# Patient Record
Sex: FEMALE | Race: WHITE | NOT HISPANIC OR LATINO | Employment: OTHER | URBAN - METROPOLITAN AREA
[De-identification: names, ages, dates, MRNs, and addresses within clinical notes are randomized per-mention and may not be internally consistent; named-entity substitution may affect disease eponyms.]

---

## 2024-09-02 ENCOUNTER — OFFICE VISIT (OUTPATIENT)
Dept: URGENT CARE | Facility: PHYSICIAN GROUP | Age: 79
End: 2024-09-02
Payer: MEDICARE

## 2024-09-02 VITALS
SYSTOLIC BLOOD PRESSURE: 90 MMHG | HEART RATE: 102 BPM | OXYGEN SATURATION: 93 % | WEIGHT: 109 LBS | BODY MASS INDEX: 18.61 KG/M2 | RESPIRATION RATE: 14 BRPM | HEIGHT: 64 IN | DIASTOLIC BLOOD PRESSURE: 48 MMHG | TEMPERATURE: 97 F

## 2024-09-02 DIAGNOSIS — S90.30XA CONTUSION OF DORSUM OF FOOT: ICD-10-CM

## 2024-09-02 PROCEDURE — 99203 OFFICE O/P NEW LOW 30 MIN: CPT | Performed by: NURSE PRACTITIONER

## 2024-09-02 PROCEDURE — 3078F DIAST BP <80 MM HG: CPT | Performed by: NURSE PRACTITIONER

## 2024-09-02 PROCEDURE — 3074F SYST BP LT 130 MM HG: CPT | Performed by: NURSE PRACTITIONER

## 2024-09-02 RX ORDER — TEMAZEPAM 15 MG/1
15 CAPSULE ORAL
COMMUNITY
Start: 2024-08-21

## 2024-09-02 RX ORDER — LEVOTHYROXINE SODIUM 88 UG/1
88 TABLET ORAL DAILY
COMMUNITY
Start: 2024-08-19

## 2024-09-02 RX ORDER — PREDNISONE 10 MG/1
10 TABLET ORAL DAILY
COMMUNITY
Start: 2024-08-19

## 2024-09-02 RX ORDER — APIXABAN 5 MG/1
5 TABLET, FILM COATED ORAL 2 TIMES DAILY
COMMUNITY
Start: 2024-08-19

## 2024-09-02 RX ORDER — FLUTICASONE PROPIONATE AND SALMETEROL 250; 50 UG/1; UG/1
POWDER RESPIRATORY (INHALATION)
COMMUNITY
Start: 2024-08-19

## 2024-09-02 RX ORDER — SPIRONOLACTONE 25 MG/1
25 TABLET ORAL DAILY
COMMUNITY
Start: 2024-08-19

## 2024-09-02 RX ORDER — LACTULOSE 10 G/15ML
SOLUTION ORAL; RECTAL
COMMUNITY
Start: 2024-08-19

## 2024-09-02 ASSESSMENT — ENCOUNTER SYMPTOMS
FALLS: 0
SENSORY CHANGE: 0
WEAKNESS: 0
CHILLS: 0
FEVER: 0
TINGLING: 0
BRUISES/BLEEDS EASILY: 0
MYALGIAS: 0

## 2024-09-03 NOTE — PROGRESS NOTES
Subjective     Kerrie Burkett is a 79 y.o. female who presents with Other (L foot infection from laceration, x1 week )            Other  Pertinent negatives include no chills, fever, myalgias, rash or weakness.   Kerrie has come into urgent care with her daughter today as she has been having redness on the top of her left foot x 1 week.  States may possibly have hit her left foot on her wheelchair.  Daughter  does have redness surrounding scabbing at the top of her foot.   foot will hurt at night when she rubs against the sheets.  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   pain will radiate from her toes to her the back of her foot. Daughter wanted to make sure there was no infection.    PMH:  has no past medical history on file.  MEDS:   Current Outpatient Medications:     ELIQUIS 5 MG Tab, Take 5 mg by mouth 2 times a day., Disp: , Rfl:     spironolactone (ALDACTONE) 25 MG Tab, Take 25 mg by mouth every day., Disp: , Rfl:     predniSONE (DELTASONE) 10 MG Tab, Take 10 mg by mouth every day., Disp: , Rfl:     levothyroxine (SYNTHROID) 88 MCG Tab, Take 88 mcg by mouth every day., Disp: , Rfl:     temazepam (RESTORIL) 15 MG Cap, Take 15 mg by mouth at bedtime as needed for Sleep., Disp: , Rfl:     fluticasone-salmeterol (ADVAIR) 250-50 MCG/ACT AEROSOL POWDER, BREATH ACTIVATED, USE 1 INHALATION BY MOUTH TWICE DAILY. RINSE MOUTH AFTER EACH USE., Disp: , Rfl:     ENULOSE 10 GM/15ML Solution, use 15 mL's by mouth twice daily as needed for constipation, Disp: , Rfl:   ALLERGIES: No Known Allergies  SURGHX: History reviewed. No pertinent surgical history.  SOCHX:    FH: Family history was reviewed, no pertinent findings to report      Review of Systems   Constitutional:  Negative for chills, fever and malaise/fatigue.   Musculoskeletal:  Negative for falls, joint pain and myalgias.   Skin:  Negative for itching and rash.        Redness with central scab at left dorsum of mid metatarsals from  "possible injury.     Neurological:  Negative for tingling, sensory change and weakness.   Endo/Heme/Allergies:  Does not bruise/bleed easily.   All other systems reviewed and are negative.             Objective     BP 90/48 (BP Location: Left arm, Patient Position: Sitting, BP Cuff Size: Adult)   Pulse (!) 102   Temp 36.1 °C (97 °F) (Temporal)   Resp 14   Ht 1.626 m (5' 4\")   Wt 49.4 kg (109 lb)   SpO2 93%   BMI 18.71 kg/m²      Physical Exam  Vitals reviewed.   Constitutional:       General: She is not in acute distress.     Appearance: Normal appearance. She is not ill-appearing, toxic-appearing or diaphoretic.   Musculoskeletal:      Left foot: Normal range of motion and normal capillary refill. No swelling, deformity, bunion, Charcot foot, foot drop, prominent metatarsal heads, laceration, tenderness, bony tenderness or crepitus. Normal pulse.   Feet:      Left foot:      Skin integrity: Erythema present. No ulcer, blister, skin breakdown, warmth, callus, dry skin or fissure.      Comments: Localized erythema on dorsum of left mid foot.  No tenderness on palpation of metatarsals.  Localized erythema surrounding single central scab.  No bruising or deformity of the dorsum of foot.    Skin:     General: Skin is warm and dry.      Findings: Abrasion and erythema present. No laceration, lesion or rash.   Neurological:      Mental Status: She is alert and oriented to person, place, and time.   Psychiatric:         Attention and Perception: Attention normal.         Mood and Affect: Mood normal.         Speech: Speech normal.         Behavior: Behavior normal. Behavior is cooperative.                             Assessment & Plan        Assessment & Plan  Contusion of dorsum of foot       -No indication of skin infection of left foot, no need for antibiotics at this time  -Recommend to wear socks at night due to discomfort of foot against sheets possible from arthritis pain vs an infection from injury, " arthritic pain may be benefited from warmth   -May use over the counter Ibuprofen or Tylenol as needed for any pain/swelling  -May use cool compresses for any swelling as needed  -Clean skin with mild soap and water, avoid antibacterial soap as this may be drying to skin  -May apply triple antibiotic ointment to scab area as needed and cover with bandage to protect wound site  -Monitor for increased swelling/redness, pain, drainage, red streaking of foot, deformity, numbness/tingling in toes, decreased range of motion with weight bearing- need re-evaluation

## 2024-11-24 ENCOUNTER — HOSPITAL ENCOUNTER (OUTPATIENT)
Dept: RADIOLOGY | Facility: MEDICAL CENTER | Age: 79
End: 2024-11-24

## 2024-11-24 ENCOUNTER — HOSPITAL ENCOUNTER (INPATIENT)
Facility: MEDICAL CENTER | Age: 79
LOS: 2 days | End: 2024-11-26
Attending: STUDENT IN AN ORGANIZED HEALTH CARE EDUCATION/TRAINING PROGRAM | Admitting: STUDENT IN AN ORGANIZED HEALTH CARE EDUCATION/TRAINING PROGRAM
Payer: MEDICARE

## 2024-11-24 DIAGNOSIS — J18.9 PNEUMONIA OF BOTH LUNGS DUE TO INFECTIOUS ORGANISM, UNSPECIFIED PART OF LUNG: ICD-10-CM

## 2024-11-24 PROCEDURE — 99418 PROLNG IP/OBS E/M EA 15 MIN: CPT | Mod: MISDOCU | Performed by: STUDENT IN AN ORGANIZED HEALTH CARE EDUCATION/TRAINING PROGRAM

## 2024-11-24 PROCEDURE — 770020 HCHG ROOM/CARE - TELE (206)

## 2024-11-25 ENCOUNTER — APPOINTMENT (OUTPATIENT)
Dept: RADIOLOGY | Facility: MEDICAL CENTER | Age: 79
End: 2024-11-25
Attending: STUDENT IN AN ORGANIZED HEALTH CARE EDUCATION/TRAINING PROGRAM
Payer: MEDICARE

## 2024-11-25 PROBLEM — E87.20 LACTIC ACIDOSIS: Status: ACTIVE | Noted: 2024-11-25

## 2024-11-25 PROBLEM — Z71.89 ACP (ADVANCE CARE PLANNING): Status: ACTIVE | Noted: 2024-11-25

## 2024-11-25 PROBLEM — E83.42 HYPOMAGNESEMIA: Status: RESOLVED | Noted: 2024-11-25 | Resolved: 2024-11-25

## 2024-11-25 PROBLEM — E83.42 HYPOMAGNESEMIA: Status: ACTIVE | Noted: 2024-11-25

## 2024-11-25 PROBLEM — Z86.718 HISTORY OF DVT (DEEP VEIN THROMBOSIS): Status: ACTIVE | Noted: 2024-11-25

## 2024-11-25 PROBLEM — E27.40 ADRENAL INSUFFICIENCY (HCC): Status: ACTIVE | Noted: 2024-11-25

## 2024-11-25 PROBLEM — E03.9 HYPOTHYROIDISM: Status: ACTIVE | Noted: 2024-11-25

## 2024-11-25 PROBLEM — A41.9 SEPSIS (HCC): Status: ACTIVE | Noted: 2024-11-25

## 2024-11-25 PROBLEM — K74.60 CIRRHOSIS (HCC): Status: ACTIVE | Noted: 2024-11-25

## 2024-11-25 PROBLEM — I21.4 NSTEMI (NON-ST ELEVATED MYOCARDIAL INFARCTION) (HCC): Status: ACTIVE | Noted: 2024-11-25

## 2024-11-25 LAB
ALBUMIN SERPL BCP-MCNC: 2.8 G/DL (ref 3.2–4.9)
ALBUMIN/GLOB SERPL: 1.1 G/DL
ALP SERPL-CCNC: 204 U/L (ref 30–99)
ALT SERPL-CCNC: 44 U/L (ref 2–50)
ANION GAP SERPL CALC-SCNC: 10 MMOL/L (ref 7–16)
APTT PPP: 38.4 SEC (ref 24.7–36)
AST SERPL-CCNC: 88 U/L (ref 12–45)
BASOPHILS # BLD AUTO: 0.1 % (ref 0–1.8)
BASOPHILS # BLD: 0.02 K/UL (ref 0–0.12)
BILIRUB SERPL-MCNC: 1.5 MG/DL (ref 0.1–1.5)
BUN SERPL-MCNC: 13 MG/DL (ref 8–22)
CALCIUM ALBUM COR SERPL-MCNC: 9.1 MG/DL (ref 8.5–10.5)
CALCIUM SERPL-MCNC: 8.1 MG/DL (ref 8.5–10.5)
CHLORIDE SERPL-SCNC: 112 MMOL/L (ref 96–112)
CO2 SERPL-SCNC: 20 MMOL/L (ref 20–33)
CORTIS SERPL-MCNC: 1.7 UG/DL (ref 0–23)
CREAT SERPL-MCNC: 0.66 MG/DL (ref 0.5–1.4)
EKG IMPRESSION: NORMAL
EOSINOPHIL # BLD AUTO: 0.01 K/UL (ref 0–0.51)
EOSINOPHIL NFR BLD: 0.1 % (ref 0–6.9)
ERYTHROCYTE [DISTWIDTH] IN BLOOD BY AUTOMATED COUNT: 52.8 FL (ref 35.9–50)
GFR SERPLBLD CREATININE-BSD FMLA CKD-EPI: 88 ML/MIN/1.73 M 2
GLOBULIN SER CALC-MCNC: 2.6 G/DL (ref 1.9–3.5)
GLUCOSE SERPL-MCNC: 122 MG/DL (ref 65–99)
HCT VFR BLD AUTO: 40.1 % (ref 37–47)
HGB BLD-MCNC: 12.8 G/DL (ref 12–16)
IMM GRANULOCYTES # BLD AUTO: 0.08 K/UL (ref 0–0.11)
IMM GRANULOCYTES NFR BLD AUTO: 0.6 % (ref 0–0.9)
INR PPP: 1.2 (ref 0.87–1.13)
LACTATE SERPL-SCNC: 1.4 MMOL/L (ref 0.5–2)
LACTATE SERPL-SCNC: 2.2 MMOL/L (ref 0.5–2)
LYMPHOCYTES # BLD AUTO: 1.15 K/UL (ref 1–4.8)
LYMPHOCYTES NFR BLD: 8.6 % (ref 22–41)
MAGNESIUM SERPL-MCNC: 2 MG/DL (ref 1.5–2.5)
MCH RBC QN AUTO: 32.4 PG (ref 27–33)
MCHC RBC AUTO-ENTMCNC: 31.9 G/DL (ref 32.2–35.5)
MCV RBC AUTO: 101.5 FL (ref 81.4–97.8)
MONOCYTES # BLD AUTO: 0.85 K/UL (ref 0–0.85)
MONOCYTES NFR BLD AUTO: 6.4 % (ref 0–13.4)
NEUTROPHILS # BLD AUTO: 11.24 K/UL (ref 1.82–7.42)
NEUTROPHILS NFR BLD: 84.2 % (ref 44–72)
NRBC # BLD AUTO: 0 K/UL
NRBC BLD-RTO: 0 /100 WBC (ref 0–0.2)
PLATELET # BLD AUTO: 99 K/UL (ref 164–446)
PLATELETS.RETICULATED NFR BLD AUTO: 2.2 % (ref 0.6–13.1)
PMV BLD AUTO: 10 FL (ref 9–12.9)
POTASSIUM SERPL-SCNC: 4.2 MMOL/L (ref 3.6–5.5)
PROCALCITONIN SERPL-MCNC: 0.83 NG/ML
PROT SERPL-MCNC: 5.4 G/DL (ref 6–8.2)
PROTHROMBIN TIME: 15.3 SEC (ref 12–14.6)
RBC # BLD AUTO: 3.95 M/UL (ref 4.2–5.4)
SODIUM SERPL-SCNC: 142 MMOL/L (ref 135–145)
TROPONIN T SERPL-MCNC: 222 NG/L (ref 6–19)
TROPONIN T SERPL-MCNC: 380 NG/L (ref 6–19)
TROPONIN T SERPL-MCNC: 419 NG/L (ref 6–19)
UFH PPP CHRO-ACNC: 0.5 IU/ML
UFH PPP CHRO-ACNC: 0.63 IU/ML
UFH PPP CHRO-ACNC: 0.65 IU/ML
WBC # BLD AUTO: 13.4 K/UL (ref 4.8–10.8)

## 2024-11-25 PROCEDURE — 84484 ASSAY OF TROPONIN QUANT: CPT

## 2024-11-25 PROCEDURE — 87040 BLOOD CULTURE FOR BACTERIA: CPT

## 2024-11-25 PROCEDURE — 85730 THROMBOPLASTIN TIME PARTIAL: CPT

## 2024-11-25 PROCEDURE — 700102 HCHG RX REV CODE 250 W/ 637 OVERRIDE(OP): Performed by: STUDENT IN AN ORGANIZED HEALTH CARE EDUCATION/TRAINING PROGRAM

## 2024-11-25 PROCEDURE — 83605 ASSAY OF LACTIC ACID: CPT | Mod: 91

## 2024-11-25 PROCEDURE — 770020 HCHG ROOM/CARE - TELE (206)

## 2024-11-25 PROCEDURE — 700111 HCHG RX REV CODE 636 W/ 250 OVERRIDE (IP)

## 2024-11-25 PROCEDURE — 99291 CRITICAL CARE FIRST HOUR: CPT | Performed by: STUDENT IN AN ORGANIZED HEALTH CARE EDUCATION/TRAINING PROGRAM

## 2024-11-25 PROCEDURE — A9270 NON-COVERED ITEM OR SERVICE: HCPCS | Performed by: NURSE PRACTITIONER

## 2024-11-25 PROCEDURE — 85055 RETICULATED PLATELET ASSAY: CPT

## 2024-11-25 PROCEDURE — 93010 ELECTROCARDIOGRAM REPORT: CPT | Performed by: INTERNAL MEDICINE

## 2024-11-25 PROCEDURE — 700117 HCHG RX CONTRAST REV CODE 255: Performed by: STUDENT IN AN ORGANIZED HEALTH CARE EDUCATION/TRAINING PROGRAM

## 2024-11-25 PROCEDURE — 84145 PROCALCITONIN (PCT): CPT

## 2024-11-25 PROCEDURE — 99497 ADVNCD CARE PLAN 30 MIN: CPT | Performed by: STUDENT IN AN ORGANIZED HEALTH CARE EDUCATION/TRAINING PROGRAM

## 2024-11-25 PROCEDURE — 83880 ASSAY OF NATRIURETIC PEPTIDE: CPT

## 2024-11-25 PROCEDURE — 700105 HCHG RX REV CODE 258

## 2024-11-25 PROCEDURE — 700102 HCHG RX REV CODE 250 W/ 637 OVERRIDE(OP): Performed by: NURSE PRACTITIONER

## 2024-11-25 PROCEDURE — 99222 1ST HOSP IP/OBS MODERATE 55: CPT | Performed by: INTERNAL MEDICINE

## 2024-11-25 PROCEDURE — 74177 CT ABD & PELVIS W/CONTRAST: CPT

## 2024-11-25 PROCEDURE — 85520 HEPARIN ASSAY: CPT

## 2024-11-25 PROCEDURE — A9270 NON-COVERED ITEM OR SERVICE: HCPCS | Performed by: STUDENT IN AN ORGANIZED HEALTH CARE EDUCATION/TRAINING PROGRAM

## 2024-11-25 PROCEDURE — 85025 COMPLETE CBC W/AUTO DIFF WBC: CPT

## 2024-11-25 PROCEDURE — 93005 ELECTROCARDIOGRAM TRACING: CPT | Performed by: STUDENT IN AN ORGANIZED HEALTH CARE EDUCATION/TRAINING PROGRAM

## 2024-11-25 PROCEDURE — 83735 ASSAY OF MAGNESIUM: CPT

## 2024-11-25 PROCEDURE — 700111 HCHG RX REV CODE 636 W/ 250 OVERRIDE (IP): Performed by: STUDENT IN AN ORGANIZED HEALTH CARE EDUCATION/TRAINING PROGRAM

## 2024-11-25 PROCEDURE — 700105 HCHG RX REV CODE 258: Performed by: STUDENT IN AN ORGANIZED HEALTH CARE EDUCATION/TRAINING PROGRAM

## 2024-11-25 PROCEDURE — 85610 PROTHROMBIN TIME: CPT

## 2024-11-25 PROCEDURE — 71275 CT ANGIOGRAPHY CHEST: CPT

## 2024-11-25 PROCEDURE — 80053 COMPREHEN METABOLIC PANEL: CPT

## 2024-11-25 PROCEDURE — 82533 TOTAL CORTISOL: CPT

## 2024-11-25 RX ORDER — ONDANSETRON 2 MG/ML
4 INJECTION INTRAMUSCULAR; INTRAVENOUS EVERY 4 HOURS PRN
Status: DISCONTINUED | OUTPATIENT
Start: 2024-11-25 | End: 2024-11-26 | Stop reason: HOSPADM

## 2024-11-25 RX ORDER — LACTULOSE 10 G/15ML
15 SOLUTION ORAL 2 TIMES DAILY
Status: DISCONTINUED | OUTPATIENT
Start: 2024-11-25 | End: 2024-11-26 | Stop reason: HOSPADM

## 2024-11-25 RX ORDER — PREDNISONE 10 MG/1
10 TABLET ORAL DAILY
Status: DISCONTINUED | OUTPATIENT
Start: 2024-11-25 | End: 2024-11-26 | Stop reason: HOSPADM

## 2024-11-25 RX ORDER — ACETAMINOPHEN 500 MG
500-1000 TABLET ORAL EVERY 6 HOURS PRN
COMMUNITY

## 2024-11-25 RX ORDER — AZITHROMYCIN 250 MG/1
500 TABLET, FILM COATED ORAL DAILY
Status: DISCONTINUED | OUTPATIENT
Start: 2024-11-26 | End: 2024-11-26 | Stop reason: HOSPADM

## 2024-11-25 RX ORDER — LEVOTHYROXINE SODIUM 88 UG/1
88 TABLET ORAL
Status: DISCONTINUED | OUTPATIENT
Start: 2024-11-25 | End: 2024-11-26 | Stop reason: HOSPADM

## 2024-11-25 RX ORDER — SODIUM CHLORIDE 9 MG/ML
INJECTION, SOLUTION INTRAVENOUS CONTINUOUS
Status: DISCONTINUED | OUTPATIENT
Start: 2024-11-25 | End: 2024-11-25

## 2024-11-25 RX ORDER — AZITHROMYCIN 500 MG/5ML
500 INJECTION, POWDER, LYOPHILIZED, FOR SOLUTION INTRAVENOUS EVERY 24 HOURS
Status: DISCONTINUED | OUTPATIENT
Start: 2024-11-25 | End: 2024-11-25

## 2024-11-25 RX ORDER — TEMAZEPAM 7.5 MG/1
15 CAPSULE ORAL
Status: DISCONTINUED | OUTPATIENT
Start: 2024-11-25 | End: 2024-11-26 | Stop reason: HOSPADM

## 2024-11-25 RX ORDER — HEPARIN SODIUM 1000 [USP'U]/ML
30 INJECTION, SOLUTION INTRAVENOUS; SUBCUTANEOUS PRN
Status: DISCONTINUED | OUTPATIENT
Start: 2024-11-25 | End: 2024-11-26

## 2024-11-25 RX ORDER — SPIRONOLACTONE 25 MG/1
25 TABLET ORAL
Status: DISCONTINUED | OUTPATIENT
Start: 2024-11-25 | End: 2024-11-26 | Stop reason: HOSPADM

## 2024-11-25 RX ORDER — ONDANSETRON 4 MG/1
4 TABLET, ORALLY DISINTEGRATING ORAL EVERY 4 HOURS PRN
Status: DISCONTINUED | OUTPATIENT
Start: 2024-11-25 | End: 2024-11-26 | Stop reason: HOSPADM

## 2024-11-25 RX ORDER — ACETAMINOPHEN 325 MG/1
650 TABLET ORAL EVERY 6 HOURS PRN
Status: DISCONTINUED | OUTPATIENT
Start: 2024-11-25 | End: 2024-11-26 | Stop reason: HOSPADM

## 2024-11-25 RX ORDER — LACTULOSE 10 G/15ML
15 SOLUTION ORAL
COMMUNITY

## 2024-11-25 RX ORDER — HEPARIN SODIUM 1000 [USP'U]/ML
60 INJECTION, SOLUTION INTRAVENOUS; SUBCUTANEOUS ONCE
Status: COMPLETED | OUTPATIENT
Start: 2024-11-25 | End: 2024-11-25

## 2024-11-25 RX ORDER — DEXMEDETOMIDINE HYDROCHLORIDE 4 UG/ML
.1-1.5 INJECTION, SOLUTION INTRAVENOUS CONTINUOUS
Status: DISCONTINUED | OUTPATIENT
Start: 2024-11-25 | End: 2024-11-25

## 2024-11-25 RX ORDER — HEPARIN SODIUM 5000 [USP'U]/100ML
0-30 INJECTION, SOLUTION INTRAVENOUS CONTINUOUS
Status: DISCONTINUED | OUTPATIENT
Start: 2024-11-25 | End: 2024-11-26

## 2024-11-25 RX ORDER — LABETALOL HYDROCHLORIDE 5 MG/ML
10 INJECTION, SOLUTION INTRAVENOUS EVERY 4 HOURS PRN
Status: DISCONTINUED | OUTPATIENT
Start: 2024-11-25 | End: 2024-11-26 | Stop reason: HOSPADM

## 2024-11-25 RX ADMIN — HEPARIN SODIUM 3000 UNITS: 1000 INJECTION, SOLUTION INTRAVENOUS; SUBCUTANEOUS at 02:49

## 2024-11-25 RX ADMIN — ACETAMINOPHEN 650 MG: 325 TABLET ORAL at 20:53

## 2024-11-25 RX ADMIN — LACTULOSE 15 ML: 10 SOLUTION ORAL at 17:14

## 2024-11-25 RX ADMIN — LEVOTHYROXINE SODIUM 88 MCG: 0.09 TABLET ORAL at 05:15

## 2024-11-25 RX ADMIN — IOHEXOL 80 ML: 350 INJECTION, SOLUTION INTRAVENOUS at 03:44

## 2024-11-25 RX ADMIN — PREDNISONE 10 MG: 10 TABLET ORAL at 05:15

## 2024-11-25 RX ADMIN — CEFTRIAXONE SODIUM 1000 MG: 10 INJECTION, POWDER, FOR SOLUTION INTRAVENOUS at 05:03

## 2024-11-25 RX ADMIN — SODIUM CHLORIDE: 9 INJECTION, SOLUTION INTRAVENOUS at 04:32

## 2024-11-25 RX ADMIN — AZITHROMYCIN 500 MG: 500 INJECTION, POWDER, LYOPHILIZED, FOR SOLUTION INTRAVENOUS at 05:11

## 2024-11-25 RX ADMIN — TEMAZEPAM 15 MG: 7.5 CAPSULE ORAL at 20:53

## 2024-11-25 RX ADMIN — LACTULOSE 15 ML: 10 SOLUTION ORAL at 05:15

## 2024-11-25 RX ADMIN — HEPARIN SODIUM 12 UNITS/KG/HR: 5000 INJECTION, SOLUTION INTRAVENOUS at 02:21

## 2024-11-25 SDOH — ECONOMIC STABILITY: TRANSPORTATION INSECURITY
IN THE PAST 12 MONTHS, HAS LACK OF RELIABLE TRANSPORTATION KEPT YOU FROM MEDICAL APPOINTMENTS, MEETINGS, WORK OR FROM GETTING THINGS NEEDED FOR DAILY LIVING?: NO

## 2024-11-25 SDOH — ECONOMIC STABILITY: TRANSPORTATION INSECURITY
IN THE PAST 12 MONTHS, HAS THE LACK OF TRANSPORTATION KEPT YOU FROM MEDICAL APPOINTMENTS OR FROM GETTING MEDICATIONS?: NO

## 2024-11-25 ASSESSMENT — LIFESTYLE VARIABLES
TOTAL SCORE: 0
EVER FELT BAD OR GUILTY ABOUT YOUR DRINKING: NO
TOTAL SCORE: 0
HOW MANY TIMES IN THE PAST YEAR HAVE YOU HAD 5 OR MORE DRINKS IN A DAY: 0
TOTAL SCORE: 0
AVERAGE NUMBER OF DAYS PER WEEK YOU HAVE A DRINK CONTAINING ALCOHOL: 0
HAVE PEOPLE ANNOYED YOU BY CRITICIZING YOUR DRINKING: NO
CONSUMPTION TOTAL: NEGATIVE
HAVE YOU EVER FELT YOU SHOULD CUT DOWN ON YOUR DRINKING: NO
EVER HAD A DRINK FIRST THING IN THE MORNING TO STEADY YOUR NERVES TO GET RID OF A HANGOVER: NO
ON A TYPICAL DAY WHEN YOU DRINK ALCOHOL HOW MANY DRINKS DO YOU HAVE: 0
ALCOHOL_USE: NO
DOES PATIENT WANT TO STOP DRINKING: NO

## 2024-11-25 ASSESSMENT — SOCIAL DETERMINANTS OF HEALTH (SDOH)
WITHIN THE LAST YEAR, HAVE TO BEEN RAPED OR FORCED TO HAVE ANY KIND OF SEXUAL ACTIVITY BY YOUR PARTNER OR EX-PARTNER?: NO
WITHIN THE PAST 12 MONTHS, THE FOOD YOU BOUGHT JUST DIDN'T LAST AND YOU DIDN'T HAVE MONEY TO GET MORE: NEVER TRUE
WITHIN THE LAST YEAR, HAVE YOU BEEN AFRAID OF YOUR PARTNER OR EX-PARTNER?: NO
IN THE PAST 12 MONTHS, HAS THE ELECTRIC, GAS, OIL, OR WATER COMPANY THREATENED TO SHUT OFF SERVICE IN YOUR HOME?: NO
WITHIN THE LAST YEAR, HAVE YOU BEEN KICKED, HIT, SLAPPED, OR OTHERWISE PHYSICALLY HURT BY YOUR PARTNER OR EX-PARTNER?: NO
WITHIN THE LAST YEAR, HAVE YOU BEEN HUMILIATED OR EMOTIONALLY ABUSED IN OTHER WAYS BY YOUR PARTNER OR EX-PARTNER?: NO
WITHIN THE PAST 12 MONTHS, YOU WORRIED THAT YOUR FOOD WOULD RUN OUT BEFORE YOU GOT THE MONEY TO BUY MORE: NEVER TRUE

## 2024-11-25 ASSESSMENT — COGNITIVE AND FUNCTIONAL STATUS - GENERAL
CLIMB 3 TO 5 STEPS WITH RAILING: A LOT
WALKING IN HOSPITAL ROOM: A LITTLE
STANDING UP FROM CHAIR USING ARMS: A LITTLE
DAILY ACTIVITIY SCORE: 18
HELP NEEDED FOR BATHING: A LITTLE
MOVING FROM LYING ON BACK TO SITTING ON SIDE OF FLAT BED: A LITTLE
SUGGESTED CMS G CODE MODIFIER MOBILITY: CK
PERSONAL GROOMING: A LITTLE
TURNING FROM BACK TO SIDE WHILE IN FLAT BAD: A LITTLE
MOVING TO AND FROM BED TO CHAIR: A LITTLE
SUGGESTED CMS G CODE MODIFIER DAILY ACTIVITY: CK
EATING MEALS: A LITTLE
DRESSING REGULAR UPPER BODY CLOTHING: A LITTLE
DRESSING REGULAR LOWER BODY CLOTHING: A LITTLE
TOILETING: A LITTLE
MOBILITY SCORE: 17

## 2024-11-25 ASSESSMENT — PATIENT HEALTH QUESTIONNAIRE - PHQ9
SUM OF ALL RESPONSES TO PHQ9 QUESTIONS 1 AND 2: 0
1. LITTLE INTEREST OR PLEASURE IN DOING THINGS: NOT AT ALL
2. FEELING DOWN, DEPRESSED, IRRITABLE, OR HOPELESS: NOT AT ALL

## 2024-11-25 ASSESSMENT — PAIN DESCRIPTION - PAIN TYPE
TYPE: CHRONIC PAIN
TYPE: ACUTE PAIN

## 2024-11-25 ASSESSMENT — FIBROSIS 4 INDEX
FIB4 SCORE: 10.85
FIB4 SCORE: 10.85

## 2024-11-25 NOTE — H&P
Hospital Medicine History & Physical Note    Date of Service  11/25/2024    Primary Care Physician  Pcp Pt States None    Consultants  None    Code Status  Full Code    Chief Complaint  Sepsis    History of Presenting Illness  Kerrie Burkett is a 79 y.o. female who presented 11/24/2024 with chills.  Patient has a history of cirrhosis of the liver, adrenal insufficiency on prednisone, DVT on Eliquis, asthma.  She presents to ED for chills that started this morning.  She initially woke up at 5 AM and went to go drink hot chocolate and went back to sleep.  She then woke up later on in the morning with intense chills.  She complains of dysuria for several days now, but thought this was normal.  She denies fever cough chest pain shortness of breath abdominal pain diarrhea.  As her symptoms persisted, she told her family members, who recommended her to come in to outside facility ER for further evaluation.  She lives with her family at home.    Labs:  Sodium 135 potassium 3.7 chloride 107 bicarbonate 24 alkaline phosphatase 270 AST 77 ALT 59 BUN 17 glucose 84 creatinine 0.6 BUN/creatinine ratio 28  Magnesium 1.5 CRP 2.68  Lactic acid 2.5 -> 2.2  Troponin 0.027 -> 3.61 -> 3.950  Procalcitonin 0.197   DDIMER 1.85  White blood cell count 10.98 .9 platelet 92  COVID/flu/RSV negative  UA suggestive of UTI (3+ bacteria, 6-10 white blood cell count, positive nitrate, trace leukocyte esterase)  Chest x-ray showing bilateral pulmonary infiltrates right side greater than left which may be due to pulmonary edema or bilateral pneumonia.  These infiltrates are more pronounced now than on chest x-ray in April 2024    Patient given 1.5L fluids, duo neb, was given Zosyn, and given loading dose of aspirin and transferred to [facility] for possible cardiac catheterization in the morning.    I discussed the plan of care with patient.    Review of Systems  ROS    Past Medical History   has no past medical history on file.    Surgical  History   has no past surgical history on file.     Family History  family history is not on file.   Family history reviewed with patient. There is no family history that is pertinent to the chief complaint.     Social History       Allergies  No Known Allergies    Medications  Prior to Admission Medications   Prescriptions Last Dose Informant Patient Reported? Taking?   ELIQUIS 5 MG Tab   Yes No   Sig: Take 5 mg by mouth 2 times a day.   ENULOSE 10 GM/15ML Solution   Yes No   Sig: use 15 mL's by mouth twice daily as needed for constipation   fluticasone-salmeterol (ADVAIR) 250-50 MCG/ACT AEROSOL POWDER, BREATH ACTIVATED   Yes No   Sig: USE 1 INHALATION BY MOUTH TWICE DAILY. RINSE MOUTH AFTER EACH USE.   levothyroxine (SYNTHROID) 88 MCG Tab   Yes No   Sig: Take 88 mcg by mouth every day.   predniSONE (DELTASONE) 10 MG Tab   Yes No   Sig: Take 10 mg by mouth every day.   spironolactone (ALDACTONE) 25 MG Tab   Yes No   Sig: Take 25 mg by mouth every day.   temazepam (RESTORIL) 15 MG Cap   Yes No   Sig: Take 15 mg by mouth at bedtime as needed for Sleep.      Facility-Administered Medications: None       Physical Exam  Temp:  [36.3 °C (97.3 °F)] 36.3 °C (97.3 °F)  Pulse:  [80] 80  Resp:  [20] 20  BP: (94)/(49) 94/49  SpO2:  [93 %] 93 %  Blood Pressure : 94/49   Temperature: 36.3 °C (97.3 °F)   Pulse: 80   Respiration: 20   Pulse Oximetry: 93 %       Physical Exam  Constitutional:       Appearance: Normal appearance.      Comments: Hard of hearing  Thin appearing female   HENT:      Head: Normocephalic.      Nose: Nose normal.      Mouth/Throat:      Mouth: Mucous membranes are moist.   Eyes:      Pupils: Pupils are equal, round, and reactive to light.   Cardiovascular:      Rate and Rhythm: Normal rate and regular rhythm.      Pulses: Normal pulses.   Pulmonary:      Effort: Pulmonary effort is normal.      Breath sounds: Normal breath sounds.   Abdominal:      General: Abdomen is flat. Bowel sounds are normal.       "Palpations: Abdomen is soft.   Musculoskeletal:         General: Normal range of motion.      Cervical back: Neck supple.   Skin:     General: Skin is warm.   Neurological:      General: No focal deficit present.      Mental Status: She is alert and oriented to person, place, and time. Mental status is at baseline.   Psychiatric:         Mood and Affect: Mood normal.         Behavior: Behavior normal.         Thought Content: Thought content normal.         Judgment: Judgment normal.         Laboratory:          No results for input(s): \"ALTSGPT\", \"ASTSGOT\", \"ALKPHOSPHAT\", \"TBILIRUBIN\", \"DBILIRUBIN\", \"GAMMAGT\", \"AMYLASE\", \"LIPASE\", \"ALB\", \"PREALBUMIN\", \"GLUCOSE\" in the last 72 hours.      No results for input(s): \"NTPROBNP\" in the last 72 hours.      No results for input(s): \"TROPONINT\" in the last 72 hours.    Imaging:  No orders to display       EKG:  I have personally reviewed the images and compared with prior images.    Assessment/Plan:  Justification for Admission Status  I anticipate this patient will require at least two midnights for appropriate medical management, necessitating inpatient admission because patient has sepsis    Patient will need a Telemetry bed on CARDIOLOGY service .  The need is secondary to sepsis.    * Sepsis (HCC)  Assessment & Plan  This is Sepsis Present on admission  SIRS criteria identified on my evaluation include: Tachycardia, with heart rate greater than 90 BPM and Leukocytosis, with WBC greater than 12,000  Clinical indicators of end organ dysfunction include Hypotension with systolic blood pressure less than 90 or MAP less than 65 and Lactic Acid greater than 2  Source is Urine  Sepsis protocol initiated  Crystalloid Fluid Administration: Fluid resuscitation ordered per standard protocol - 30 mL/kg per current or ideal body weight  IV antibiotics as appropriate for source of sepsis  Reassessment: I have reassessed the patient's hemodynamic status    Patient found to have " sepsis, likely secondary to UTI.  She complains of dysuria.  Given Zosyn at outside hospital, I think Rocephin will be enough for now.  Lactic acid 2.2, fluids  Chest x-ray showing bilateral pulmonary infiltrates right side greater than left that may be due to pulmonary edema versus infection.  BNP reportedly normal at outside facility.  I do not appreciate Rales or lower extremity swelling from patient.  Due to elevated D-dimer, CTPA pending.  CTAP ordered as well to assess for any intra-abdominal infection that could explain patient's symptoms    ACP (advance care planning)  Assessment & Plan  Patient initially DNR/DNI, revoked that and is now full code.  This was confirmed with patient    Lactic acidosis  Assessment & Plan  Lactic acid 2.2 here, 2.5 at outside hospital  Fluids  Trend lactic acid    Cirrhosis (HCC)  Assessment & Plan  Continue home lactulose    History of DVT (deep vein thrombosis)  Assessment & Plan  On eliquis 5 mg po bid  Currently on heparin drip    Hypothyroidism  Assessment & Plan  Synthroid 88 mcg     Adrenal insufficiency (HCC)  Assessment & Plan  Send cortisol levels  Continue home prednisone 10 mg po daily and aldactone 25 mg po daily     NSTEMI (non-ST elevated myocardial infarction) (HCC)- (present on admission)  Assessment & Plan  Patient without any chest pain. 0.027 -> 3.61 -> 3.950 at outside facility and 419 here.  EKG showing normal sinus rhythm with nonspecific changes in the lateral leads.  Patient has been loaded with aspirin at outside hospital.  NSTEMI versus demand ischemia from sepsis.  Heparin drip  Echocardiogram      Patient is critically ill.   The patient continues to have: Sepsis secondary to UTI, NSTEMI, lactic acidosis  The vital organ system that is affected is the: Cardiovascular system  If untreated there is a high chance of deterioration into: Septic shock  And eventually death.   The critical care that I am providing today is: Fluids, IV heparin drip, IV  Rocephin  The critical that has been undertaken is medically complex.   There has been no overlap in critical care time.   Critical Care Time not including procedures: 34      VTE prophylaxis: therapeutic anticoagulation with heparin drip

## 2024-11-25 NOTE — PROGRESS NOTES
Med Rec competed per pt      Allergies reviewed: y    Antibiotics in the past 30 days: n    Anticoagulant in past 14 days: y    Anticoagulant:Eliquis 5mg Last dose: 11/24/2024    Pharmacy patient utilizes: [facility]  [phone]

## 2024-11-25 NOTE — CONSULTS
Cardiology Initial Consultation    Date of Service  11/25/2024    Referring Physician  Olga Morrow M.D.    Reason for Consultation  Elevated troponin level.     History of Presenting Illness  Kerrie Burkett is a 79 y.o. female with a past medical history of deep venous thrombosis on chronic anticoagulation therapy, cirrhosis, adrenal insufficiency treated with steroid therapy, who presented 11/24/2024 with pneumonia. Laboratory evaluation demonstrated elevated troponin level. She is clinically doing well from cardiac standpoint without chest pain.     Review of Systems  Review of Systems   Unable to perform ROS: Other       Past Medical History   has no past medical history on file.    Surgical History   has no past surgical history on file.    Family History  family history is not on file.    Social History       Medications  Prior to Admission Medications   Prescriptions Last Dose Informant Patient Reported? Taking?   ELIQUIS 5 MG Tab 10/19/2024  Yes No   Sig: Take 5 mg by mouth 2 times a day.   ENULOSE 10 GM/15ML Solution   Yes No   Sig: use 15 mL's by mouth twice daily as needed for constipation   fluticasone-salmeterol (ADVAIR) 250-50 MCG/ACT AEROSOL POWDER, BREATH ACTIVATED   Yes No   Sig: USE 1 INHALATION BY MOUTH TWICE DAILY. RINSE MOUTH AFTER EACH USE.   levothyroxine (SYNTHROID) 88 MCG Tab 10/20/2024  Yes No   Sig: Take 88 mcg by mouth every day.   predniSONE (DELTASONE) 10 MG Tab 10/19/2024  Yes No   Sig: Take 10 mg by mouth every day.   spironolactone (ALDACTONE) 25 MG Tab 10/19/2024  Yes No   Sig: Take 25 mg by mouth every day.   temazepam (RESTORIL) 15 MG Cap 10/26/2024  Yes No   Sig: Take 15 mg by mouth at bedtime as needed for Sleep.      Facility-Administered Medications: None       Allergies  No Known Allergies    Vital signs in last 24 hours  Temp:  [36.3 °C (97.3 °F)-36.4 °C (97.5 °F)] 36.3 °C (97.3 °F)  Pulse:  [79-82] 79  Resp:  [12-20] 15  BP: ()/(48-57) 114/57  SpO2:  [93 %-94  %] 94 %    Physical Exam  Physical Exam  Constitutional:       Appearance: Normal appearance. She is well-developed and normal weight.   HENT:      Head: Normocephalic and atraumatic.      Mouth/Throat:      Mouth: Mucous membranes are moist.   Eyes:      Extraocular Movements: Extraocular movements intact.      Conjunctiva/sclera: Conjunctivae normal.   Cardiovascular:      Rate and Rhythm: Normal rate and regular rhythm.      Pulses: Normal pulses.      Heart sounds: Normal heart sounds.   Pulmonary:      Effort: Pulmonary effort is normal.      Breath sounds: Normal breath sounds.   Abdominal:      General: Bowel sounds are normal.      Palpations: Abdomen is soft.   Musculoskeletal:         General: Normal range of motion.      Cervical back: Normal range of motion and neck supple.   Skin:     General: Skin is warm and dry.   Neurological:      General: No focal deficit present.      Mental Status: She is alert and oriented to person, place, and time. Mental status is at baseline.   Psychiatric:         Mood and Affect: Mood normal.         Behavior: Behavior normal.         Thought Content: Thought content normal.         Judgment: Judgment normal.         Lab Review  Lab Results   Component Value Date/Time    WBC 13.4 (H) 11/25/2024 12:41 AM    RBC 3.95 (L) 11/25/2024 12:41 AM    HEMOGLOBIN 12.8 11/25/2024 12:41 AM    HEMATOCRIT 40.1 11/25/2024 12:41 AM    .5 (H) 11/25/2024 12:41 AM    MCH 32.4 11/25/2024 12:41 AM    MCHC 31.9 (L) 11/25/2024 12:41 AM    MPV 10.0 11/25/2024 12:41 AM      Lab Results   Component Value Date/Time    SODIUM 142 11/25/2024 12:41 AM    POTASSIUM 4.2 11/25/2024 12:41 AM    CHLORIDE 112 11/25/2024 12:41 AM    CO2 20 11/25/2024 12:41 AM    GLUCOSE 122 (H) 11/25/2024 12:41 AM    BUN 13 11/25/2024 12:41 AM    CREATININE 0.66 11/25/2024 12:41 AM      Lab Results   Component Value Date/Time    ASTSGOT 88 (H) 11/25/2024 12:41 AM    ALTSGPT 44 11/25/2024 12:41 AM     Lab Results  "  Component Value Date/Time    TROPONINT 380 (H) 11/25/2024 02:11 AM       No results for input(s): \"NTPROBNP\" in the last 72 hours.    Cardiac Imaging and Procedures Review  CARDIAC STUDIES/PROCEDURES:    CTA OF CHEST (11/25/24)  1.  No pulmonary embolus appreciated.  2.  Small layering right pleural effusion  3.  Bilateral lower lobe and right middle lobe infiltrates, greatest in the right lower lobe  4.  Interlobular septal thickening and groundglass pulmonary opacities, appearance favoring component of pulmonary edema  (study result reviewed)     EKG performed on (11/25/24) was reviewed: EKG personally interpreted shows sinus rhythm.     Assessment/Plan  Elevated troponin level:  She is a 79 y.o. female with a past medical history of deep venous thrombosis on chronic anticoagulation therapy who presented 11/24/2024 with pneumonia.The troponin level was elevated, however, she is clinically doing well  and appear to be type II in origin. We will continue with medical therapy only. We will perform an echocardiogram today and plan for myocardial perfusion imaging study when she is clinically recovered from pneumonia standpoint. OK as outpatient.  History of cirrhosis, adrenal insufficiency treated with steroid therapy.        Thank you for allowing me to participate in the care of this patient.    I will continue to follow this patient    Please contact me with any questions.    Guido Garcia M.D.   Cardiologist, [facility]  [phone]          "

## 2024-11-25 NOTE — PROGRESS NOTES
Report received, patient transferred to tele 7 with all belongings.Pt A&O4, no complaints of chest pain or SOB, on RA, tele box on, monitors notified. All fall and safety precautions in place, pt educated on use of call light for assistance, no further needs at this time.

## 2024-11-25 NOTE — DISCHARGE PLANNING
Case Management Discharge Planning    Admission Date: 11/24/2024  GMLOS: 4.9  ALOS: 1    TRANSFER BACK PATIENT    Referring Facility: [facility]   Reason for Transfer: Cardiology     Signed Repatriation/Transfer Back Agreement saved in .    Once this patient has received the requested service or the patient no longer requires tertiary level of care from [facility] and is stable to transfer back to referring facility, we can facilitate a transfer back. Please contact the [facility] at [phone] to coordinate this transfer request.

## 2024-11-25 NOTE — CARE PLAN
The patient is Watcher - Medium risk of patient condition declining or worsening    Shift Goals  Clinical Goals: heparin gtt, IV antibiotics  Patient Goals: sleep  Family Goals: darell    Progress made toward(s) clinical / shift goals:    Problem: Knowledge Deficit - Standard  Goal: Patient and family/care givers will demonstrate understanding of plan of care, disease process/condition, diagnostic tests and medications  Outcome: Progressing     Problem: Skin Integrity  Goal: Skin integrity is maintained or improved  Outcome: Progressing     Problem: Fall Risk  Goal: Patient will remain free from falls  Outcome: Progressing     Problem: Respiratory  Goal: Patient will achieve/maintain optimum respiratory ventilation and gas exchange  Outcome: Progressing     Problem: Urinary - Renal Perfusion  Goal: Ability to achieve and maintain adequate renal perfusion and functioning will improve  Outcome: Progressing       Patient is not progressing towards the following goals:      Problem: Hemodynamics - Pneumonia  Goal: Patient's hemodynamics, fluid balance and neurologic status will be stable or improve  Outcome: Not Progressing

## 2024-11-25 NOTE — PROGRESS NOTES
[facility] DIRECT ADMISSION REPORT  Transferring facility: [facility] inpatient service  Transferring physician: Ca Sanderson    Chief complaint: Elevated troponin  Pertinent history & patient course: 79-year-old female with history of COPD, prior bilateral DVT on Eliquis, LUIS MIGUEL, NAFLD, adrenal insufficiency dependent on steroid admitted earlier today, 12 hours ago for sepsis thought to be secondary to bilateral pneumonia, UTI, dehydration.  Repeat troponin however rising, concern for NSTEMI.  Transfer request for possible cardiology evaluation.  Patient has shortness of breath, no chest pain, heart rate in 100s to 110s, hemodynamic stable per transferring provider.    Patient has been given IVF, Zosyn in emergency room, ceftriaxone after being admitted, aspirin, Eliquis.  Patient was DNR, wants to be full code for cardiac evaluation.    Pertinent imaging & lab results:   Lactic acid 2.5 -> 2.2  Creatinine 0.06  Troponin 0.027 --> 3.95 (ULN 0.03)      Consultants called prior to transfer and pertinent input from consultants: NA  Code Status:  per transferring provider, I personally verified with the transferring provider patient's code status and the transferring provider has confirmed this with the patient.  Reason for Transfer: Cardiology evaluation  Further work up or recommendations requested prior to transfer: NSTEMI    Patient accepted for transfer: Yes  Accepting [facility] Facility: [facility] - Nursing to notify the Triage Coordinator in the RTOC via Voalte or Phone ext. [phone] when patient arrives to the unit. The Triage Coordinator will assign the admitting provider.    Consultants to be called upon arrival: Cardiology if clinically warranted  Admission status: Inpatient.   Floor requested: Telemetry  If ICU transfer, name of intensivist case discussed with and pertinent input from critical care:     The admitting provider is the point of contact for questions or concerns regarding  patient's care.    Time spent: 17 minutes in chart review, coordinating transfer planning with our chart, coordinating care plan with requesting transferring physician hospitalist

## 2024-11-25 NOTE — ASSESSMENT & PLAN NOTE
This is Sepsis Present on admission  SIRS criteria identified on my evaluation include: Tachycardia, with heart rate greater than 90 BPM and Leukocytosis, with WBC greater than 12,000  Clinical indicators of end organ dysfunction include Hypotension with systolic blood pressure less than 90 or MAP less than 65 and Lactic Acid greater than 2  Source is Urine  Sepsis protocol initiated  Crystalloid Fluid Administration: Fluid resuscitation ordered per standard protocol - 30 mL/kg per current or ideal body weight  IV antibiotics as appropriate for source of sepsis  Reassessment: I have reassessed the patient's hemodynamic status    Patient found to have sepsis, likely secondary to UTI.  She complains of dysuria.  Given Zosyn at outside hospital, I think Rocephin will be enough for now.  Lactic acid 2.2, fluids  Chest x-ray showing bilateral pulmonary infiltrates right side greater than left that may be due to pulmonary edema versus infection.  BNP reportedly normal at outside facility.  I do not appreciate Rales or lower extremity swelling from patient.  Due to elevated D-dimer, CTPA pending.  CTAP ordered as well to assess for any intra-abdominal infection that could explain patient's symptoms

## 2024-11-25 NOTE — ASSESSMENT & PLAN NOTE
Patient without any chest pain. 0.027 -> 3.61 -> 3.950 at outside facility and 419 here.  EKG showing normal sinus rhythm with nonspecific changes in the lateral leads.  Patient has been loaded with aspirin at outside hospital.  NSTEMI versus demand ischemia from sepsis.  Heparin drip  Echocardiogram

## 2024-11-25 NOTE — DISCHARGE PLANNING
Care Transition Team Assessment    Patient, Kerrie Burkett, is an 79-year-old admitted for sepsis. Patient is alert and oriented x4; information was given by the patient and patient's son, Nikko [phone]. Please see pt's H&P for prior medical history. Patient's son reports both living in one story home with a ramp to enter; [address] [facility] [address]. Pt confirmed contacts on facesheet, son Nikko and daughter Thien [phone]. Patient does have a PCP, Marlyn Bone in [facility]. Patient reports good support from family. Patient does have transportation once medically cleared for discharge, family can . Confirmed medical insurance is Medicare and [employer]. Patient requires assistance with transportation and medication management; son provides support. Patient utilizes walker.     Per patient's son, patient is going to visit her daughter for the winter. Anticipate no needs.    Identified DC needs at this time:    None.    Information Source  Information Given By: Patient, Relative  Informant's Name: Tal  Who is responsible for making decisions for patient? : Patient    Readmission Evaluation  Is this a readmission?: No    Elopement Risk  Legal Hold: No  Ambulatory or Self Mobile in Wheelchair: Yes  Disoriented: No  Psychiatric Symptoms: None  History of Wandering: No  Elopement this Admit: No  Vocalizing Wanting to Leave: No  Displays Behaviors, Body Language Wanting to Leave: No-Not at Risk for Elopement  Elopement Risk: Not at Risk for Elopement    Interdisciplinary Discharge Planning  Primary Care Physician: Marlyn Bone  Lives with - Patient's Self Care Capacity: Adult Children  Patient or legal guardian wants to designate a caregiver: No  Support Systems: Friends / Neighbors, Family Member(s)  Housing / Facility: 1 Story House    Discharge Preparedness  What is your plan after discharge?: Home with help  What are your discharge supports?: Child  Prior Functional Level: Ambulatory,  Independent with Activities of Daily Living, Independent with Medication Management, Uses Walker  Difficulity with ADLs: None  Difficulity with IADLs: Driving, Managing medication    Functional Assesment  Prior Functional Level: Ambulatory, Independent with Activities of Daily Living, Independent with Medication Management, Uses Walker    Vision / Hearing Impairment  Vision Impairment : Yes  Right Eye Vision: Impaired, Wears Glasses  Left Eye Vision: Impaired, Wears Glasses  Hearing Impairment : Yes  Hearing Impairment: Both Ears, Hearing Device(s) Available  Does Pt Need Special Equipment for the Hearing Impaired?: No    Domestic Abuse  Possible Abuse/Neglect Reported to:: Not Applicable    Discharge Risks or Barriers  Discharge risks or barriers?: No    Anticipated Discharge Information  Discharge Disposition: Discharged to home/self care (01)

## 2024-11-26 ENCOUNTER — PHARMACY VISIT (OUTPATIENT)
Dept: PHARMACY | Facility: MEDICAL CENTER | Age: 79
End: 2024-11-26
Payer: COMMERCIAL

## 2024-11-26 ENCOUNTER — APPOINTMENT (OUTPATIENT)
Dept: CARDIOLOGY | Facility: MEDICAL CENTER | Age: 79
End: 2024-11-26
Attending: STUDENT IN AN ORGANIZED HEALTH CARE EDUCATION/TRAINING PROGRAM
Payer: MEDICARE

## 2024-11-26 VITALS
HEIGHT: 64 IN | DIASTOLIC BLOOD PRESSURE: 80 MMHG | HEART RATE: 102 BPM | WEIGHT: 115.08 LBS | BODY MASS INDEX: 19.65 KG/M2 | TEMPERATURE: 97 F | OXYGEN SATURATION: 93 % | SYSTOLIC BLOOD PRESSURE: 143 MMHG | RESPIRATION RATE: 17 BRPM

## 2024-11-26 LAB
ALBUMIN SERPL BCP-MCNC: 2.6 G/DL (ref 3.2–4.9)
ALBUMIN/GLOB SERPL: 1 G/DL
ALP SERPL-CCNC: 183 U/L (ref 30–99)
ALT SERPL-CCNC: 37 U/L (ref 2–50)
ANION GAP SERPL CALC-SCNC: 9 MMOL/L (ref 7–16)
AST SERPL-CCNC: 68 U/L (ref 12–45)
BASOPHILS # BLD AUTO: 0.4 % (ref 0–1.8)
BASOPHILS # BLD: 0.02 K/UL (ref 0–0.12)
BILIRUB SERPL-MCNC: 1.1 MG/DL (ref 0.1–1.5)
BUN SERPL-MCNC: 11 MG/DL (ref 8–22)
CALCIUM ALBUM COR SERPL-MCNC: 9.2 MG/DL (ref 8.5–10.5)
CALCIUM SERPL-MCNC: 8.1 MG/DL (ref 8.5–10.5)
CHLORIDE SERPL-SCNC: 109 MMOL/L (ref 96–112)
CO2 SERPL-SCNC: 22 MMOL/L (ref 20–33)
CREAT SERPL-MCNC: 0.53 MG/DL (ref 0.5–1.4)
EOSINOPHIL # BLD AUTO: 0.15 K/UL (ref 0–0.51)
EOSINOPHIL NFR BLD: 2.9 % (ref 0–6.9)
ERYTHROCYTE [DISTWIDTH] IN BLOOD BY AUTOMATED COUNT: 53.1 FL (ref 35.9–50)
GFR SERPLBLD CREATININE-BSD FMLA CKD-EPI: 93 ML/MIN/1.73 M 2
GLOBULIN SER CALC-MCNC: 2.5 G/DL (ref 1.9–3.5)
GLUCOSE SERPL-MCNC: 70 MG/DL (ref 65–99)
HCT VFR BLD AUTO: 38 % (ref 37–47)
HGB BLD-MCNC: 12.1 G/DL (ref 12–16)
IMM GRANULOCYTES # BLD AUTO: 0.01 K/UL (ref 0–0.11)
IMM GRANULOCYTES NFR BLD AUTO: 0.2 % (ref 0–0.9)
LV EJECT FRACT  99904: 60
LV EJECT FRACT MOD 2C 99903: 75.31
LV EJECT FRACT MOD 4C 99902: 75.9
LV EJECT FRACT MOD BP 99901: 74.03
LYMPHOCYTES # BLD AUTO: 1.26 K/UL (ref 1–4.8)
LYMPHOCYTES NFR BLD: 24.6 % (ref 22–41)
MCH RBC QN AUTO: 32.6 PG (ref 27–33)
MCHC RBC AUTO-ENTMCNC: 31.8 G/DL (ref 32.2–35.5)
MCV RBC AUTO: 102.4 FL (ref 81.4–97.8)
MONOCYTES # BLD AUTO: 0.51 K/UL (ref 0–0.85)
MONOCYTES NFR BLD AUTO: 9.9 % (ref 0–13.4)
NEUTROPHILS # BLD AUTO: 3.18 K/UL (ref 1.82–7.42)
NEUTROPHILS NFR BLD: 62 % (ref 44–72)
NRBC # BLD AUTO: 0 K/UL
NRBC BLD-RTO: 0 /100 WBC (ref 0–0.2)
NT-PROBNP SERPL IA-MCNC: 920 PG/ML (ref 0–125)
PLATELET # BLD AUTO: 91 K/UL (ref 164–446)
PLATELETS.RETICULATED NFR BLD AUTO: 4.3 % (ref 0.6–13.1)
PMV BLD AUTO: 10.9 FL (ref 9–12.9)
POTASSIUM SERPL-SCNC: 4 MMOL/L (ref 3.6–5.5)
PROT SERPL-MCNC: 5.1 G/DL (ref 6–8.2)
RBC # BLD AUTO: 3.71 M/UL (ref 4.2–5.4)
SODIUM SERPL-SCNC: 140 MMOL/L (ref 135–145)
UFH PPP CHRO-ACNC: 0.34 IU/ML
WBC # BLD AUTO: 5.1 K/UL (ref 4.8–10.8)

## 2024-11-26 PROCEDURE — 97163 PT EVAL HIGH COMPLEX 45 MIN: CPT

## 2024-11-26 PROCEDURE — A9270 NON-COVERED ITEM OR SERVICE: HCPCS | Performed by: STUDENT IN AN ORGANIZED HEALTH CARE EDUCATION/TRAINING PROGRAM

## 2024-11-26 PROCEDURE — 93306 TTE W/DOPPLER COMPLETE: CPT | Mod: 26 | Performed by: INTERNAL MEDICINE

## 2024-11-26 PROCEDURE — 97166 OT EVAL MOD COMPLEX 45 MIN: CPT

## 2024-11-26 PROCEDURE — 80053 COMPREHEN METABOLIC PANEL: CPT

## 2024-11-26 PROCEDURE — 700102 HCHG RX REV CODE 250 W/ 637 OVERRIDE(OP): Performed by: STUDENT IN AN ORGANIZED HEALTH CARE EDUCATION/TRAINING PROGRAM

## 2024-11-26 PROCEDURE — 85055 RETICULATED PLATELET ASSAY: CPT

## 2024-11-26 PROCEDURE — RXMED WILLOW AMBULATORY MEDICATION CHARGE: Performed by: STUDENT IN AN ORGANIZED HEALTH CARE EDUCATION/TRAINING PROGRAM

## 2024-11-26 PROCEDURE — 93306 TTE W/DOPPLER COMPLETE: CPT

## 2024-11-26 PROCEDURE — 85520 HEPARIN ASSAY: CPT

## 2024-11-26 PROCEDURE — 700105 HCHG RX REV CODE 258: Performed by: STUDENT IN AN ORGANIZED HEALTH CARE EDUCATION/TRAINING PROGRAM

## 2024-11-26 PROCEDURE — 97162 PT EVAL MOD COMPLEX 30 MIN: CPT

## 2024-11-26 PROCEDURE — 99239 HOSP IP/OBS DSCHRG MGMT >30: CPT | Performed by: STUDENT IN AN ORGANIZED HEALTH CARE EDUCATION/TRAINING PROGRAM

## 2024-11-26 PROCEDURE — 700111 HCHG RX REV CODE 636 W/ 250 OVERRIDE (IP): Performed by: STUDENT IN AN ORGANIZED HEALTH CARE EDUCATION/TRAINING PROGRAM

## 2024-11-26 PROCEDURE — 97530 THERAPEUTIC ACTIVITIES: CPT

## 2024-11-26 PROCEDURE — 97535 SELF CARE MNGMENT TRAINING: CPT

## 2024-11-26 PROCEDURE — 85025 COMPLETE CBC W/AUTO DIFF WBC: CPT

## 2024-11-26 RX ORDER — AZITHROMYCIN 500 MG/1
500 TABLET, FILM COATED ORAL DAILY
Qty: 1 TABLET | Refills: 0 | Status: ACTIVE | OUTPATIENT
Start: 2024-11-27 | End: 2024-11-28

## 2024-11-26 RX ORDER — GUAIFENESIN 600 MG/1
600 TABLET, EXTENDED RELEASE ORAL EVERY 12 HOURS
Qty: 14 TABLET | Refills: 0 | Status: SHIPPED | OUTPATIENT
Start: 2024-11-26 | End: 2024-12-03

## 2024-11-26 RX ORDER — GUAIFENESIN 600 MG/1
600 TABLET, EXTENDED RELEASE ORAL EVERY 12 HOURS
Status: DISCONTINUED | OUTPATIENT
Start: 2024-11-26 | End: 2024-11-26

## 2024-11-26 RX ORDER — GUAIFENESIN 600 MG/1
600 TABLET, EXTENDED RELEASE ORAL EVERY 12 HOURS
Status: DISCONTINUED | OUTPATIENT
Start: 2024-11-26 | End: 2024-11-26 | Stop reason: HOSPADM

## 2024-11-26 RX ADMIN — AZITHROMYCIN DIHYDRATE 500 MG: 250 TABLET ORAL at 05:05

## 2024-11-26 RX ADMIN — PREDNISONE 10 MG: 10 TABLET ORAL at 05:05

## 2024-11-26 RX ADMIN — CEFTRIAXONE SODIUM 1000 MG: 10 INJECTION, POWDER, FOR SOLUTION INTRAVENOUS at 06:28

## 2024-11-26 RX ADMIN — LACTULOSE 15 ML: 10 SOLUTION ORAL at 05:05

## 2024-11-26 RX ADMIN — SPIRONOLACTONE 25 MG: 25 TABLET ORAL at 05:05

## 2024-11-26 RX ADMIN — GUAIFENESIN 600 MG: 600 TABLET, EXTENDED RELEASE ORAL at 13:56

## 2024-11-26 RX ADMIN — LEVOTHYROXINE SODIUM 88 MCG: 0.09 TABLET ORAL at 05:05

## 2024-11-26 ASSESSMENT — FIBROSIS 4 INDEX: FIB4 SCORE: 11.81

## 2024-11-26 ASSESSMENT — COGNITIVE AND FUNCTIONAL STATUS - GENERAL
MOVING FROM LYING ON BACK TO SITTING ON SIDE OF FLAT BED: A LITTLE
MOVING TO AND FROM BED TO CHAIR: A LITTLE
TURNING FROM BACK TO SIDE WHILE IN FLAT BAD: A LITTLE
DRESSING REGULAR UPPER BODY CLOTHING: A LITTLE
CLIMB 3 TO 5 STEPS WITH RAILING: A LOT
SUGGESTED CMS G CODE MODIFIER MOBILITY: CK
WALKING IN HOSPITAL ROOM: A LITTLE
TOILETING: A LITTLE
DRESSING REGULAR LOWER BODY CLOTHING: A LITTLE
STANDING UP FROM CHAIR USING ARMS: A LITTLE
DAILY ACTIVITIY SCORE: 19
MOBILITY SCORE: 17
SUGGESTED CMS G CODE MODIFIER DAILY ACTIVITY: CK
HELP NEEDED FOR BATHING: A LITTLE
PERSONAL GROOMING: A LITTLE

## 2024-11-26 ASSESSMENT — PAIN DESCRIPTION - PAIN TYPE: TYPE: ACUTE PAIN

## 2024-11-26 ASSESSMENT — GAIT ASSESSMENTS
DISTANCE (FEET): 20
ASSISTIVE DEVICE: FRONT WHEEL WALKER
DEVIATION: BRADYKINETIC
GAIT LEVEL OF ASSIST: SUPERVISED

## 2024-11-26 ASSESSMENT — ACTIVITIES OF DAILY LIVING (ADL): TOILETING: INDEPENDENT

## 2024-11-26 NOTE — THERAPY
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 female with chills, dysuria.Dx'd with sepsis, likely secondary to UTI. Lactic acidosis. Non-STEMI likely due to demand ischemia. Hx of DVT, cirrhosis, adrenal insufficiency.    Pt with a physical therapy clinical presentation of Evolving with the following Problems: Decreased Activity Tolerance, Impaired Ambulation. Please see flowsheet below for information.   Pt is currently below their functional baseline and anticipate that pt will benefit from home with family assistance upon DC from hospital. No further acute PT needs, pt to DC home today.       Plan         DC Equipment Recommendations: None  Discharge Recommendations: Anticipate that the patient will have no further physical therapy needs after discharge from the hospital (home with family or CG assistance)       Subjective    \"I need to walk. I've been sitting in this position too long.\"     Objective       11/26/24 1413   Time In/Time Out   Therapy Start Time 1351   Therapy End Time 1413   Total Therapy Time 22   Initial Contact Note    Initial Contact Note Order Received and Verified, Evaluation Only - Patient Does Not Require Further Acute Physical Therapy at this Time.  However, May Benefit from Post Acute Therapy for Higher Level Functional Deficits.   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, pending definitive plan as an outpatient   Vitals   O2 Delivery Device None - Room Air   Vitals Comments pt with auditory rhonchi, pt able to cough up phlegm during session after amb   Prior Living Situation   Housing / Facility [facility] "   Steps Into Home   (ramp)   Steps In Home 0   Equipment Owned 4-Wheel Walker;Tub / Shower Seat;Hand Held Shower;Sock Aid;Reacher;Long Handled Shoehorn   Lives with - Patient's Self Care Capacity Adult Children   Comments Pt lives with her son who works and reports when he is at work, they have a caregiver come so pt is only home alone for short periods.   Prior Level of Functional Mobility   Assistive Devices Used 4-Wheel Walker   Cognition    Cognition / Consciousness X   Speech/ Communication Hard of Hearing   Level of Consciousness Alert   Comments Pleasant and cooperative, [place] and hears best from R ear with hearing aid   Active ROM Lower Body    Active ROM Lower Body  WDL   Strength Lower Body   Lower Body Strength  WDL   Sensation Lower Body   Lower Extremity Sensation   WDL   Coordination Lower Body    Coordination Lower Body  WDL   Balance Assessment   Sitting Balance (Static) Normal   Sitting Balance (Dynamic) Good   Standing Balance (Static) Fair +   Standing Balance (Dynamic) Fair   Weight Shift Sitting Good   Weight Shift Standing Good   Comments standing with FWW or no AD during ADLs at toilet   Bed Mobility    Comments at EOB upon entry, in chair upon exit   Gait Analysis   Gait Level Of Assist Supervised   Assistive Device Front Wheel Walker   Distance (Feet) 20   # of Times Distance was Traveled 2   Deviation Bradykinetic  (kyphotic, scoliotic)   Functional Mobility   Sit to Stand Supervised   Bed, Chair, Wheelchair Transfer Supervised   Toilet Transfers Standby Assist  (low toilet)   Transfer Method Stand Step   6 Clicks Assessment - How much HELP from from another person do you currently need... (If the patient hasn't done an activity recently, how much help from another person do you think he/she would need if he/she tried?)   Turning from your back to your side while in a flat bed without using bedrails? 3   Moving from lying on your back to sitting on the side of a flat bed without using  bedrails? 3   Moving to and from a bed to a chair (including a wheelchair)? 3   Standing up from a chair using your arms (e.g., wheelchair, or bedside chair)? 3   Walking in hospital room? 3   Climbing 3-5 steps with a railing? 2   6 clicks Mobility Score 17   Activity Tolerance   Comments limited by rhonchi, no dizziness, no SOB   Education Group   Education Provided Role of Physical Therapist;Use of Assistive Device   Role of Physical Therapist Patient Response Patient;Family;Acceptance;Explanation;Verbal Demonstration   Use of Assistive Device Patient Response Patient;Family;Acceptance;Explanation;Action Demonstration  (to keep FWW close with transfers)   Additional Comments breathing techniques and positioning to get deep breathes to lower lobes, continue to be mobilie to assist with PNA   Problem List    Problems Decreased Activity Tolerance;Impaired Ambulation   Anticipated Discharge Equipment and Recommendations   DC Equipment Recommendations None   Discharge Recommendations Anticipate that the patient will have no further physical therapy needs after discharge from the hospital  (home with family or CG assistance)   Interdisciplinary Plan of Care Collaboration   IDT Collaboration with  Nursing;Family / Caregiver  (son in room)   Patient Position at End of Therapy Seated;Chair Alarm On;Call Light within Reach;Tray Table within Reach;Family / Friend in Room   Collaboration Comments RN updated

## 2024-11-26 NOTE — CARE PLAN
The patient is Stable - Low risk of patient condition declining or worsening    Shift Goals  Clinical Goals: Xa protocol , labs, IV abx  Patient Goals: rest, feel better  Family Goals: darell    Progress made toward(s) clinical / shift goals:        Problem: Knowledge Deficit - Standard  Goal: Patient and family/care givers will demonstrate understanding of plan of care, disease process/condition, diagnostic tests and medications  Outcome: Progressing  Patient verbalizes understanding of plan of care. Asking pertinenet questions and actively participating in care.      Problem: Skin Integrity  Goal: Skin integrity is maintained or improved  Outcome: Progressing       Problem: Fall Risk  Goal: Patient will remain free from falls  Outcome: Progressing  Pt safety precautions in place; bed in lowest lock position, 2 side rails up, non slip socks on, call light within reach- educated on when and how to use call light.        Patient is not progressing towards the following goals:

## 2024-11-26 NOTE — DISCHARGE INSTRUCTIONS
- Follow up with primary care physician in 1 week.     - Please take the medications as instructed    - Go to the local Emergency Department if you have any worsening condition.     Sepsis, Diagnosis, Adult  Sepsis is a serious bodily reaction to an infection. The infection that triggers sepsis may be from a bacteria, virus, or fungus. Sepsis can result from an infection in any part of your body. Infections that commonly lead to sepsis include skin, lung, and urinary tract infections.  Sepsis is a medical emergency that must be treated right away in a hospital. In severe cases, it can lead to septic shock. Septic shock can weaken your heart and cause your blood pressure to drop. This can cause your central nervous system and your body's organs to stop working.  What are the causes?  This condition is caused by a severe reaction to infections from bacteria, viruses, or fungus. The germs that most often lead to sepsis include:  Escherichia coli (E. coli) bacteria.  Staphylococcus aureus (staph) bacteria.  Some types of Streptococcus bacteria.  The most common infections affect these organs:  The lung (pneumonia).  The kidneys or bladder (urinary tract infection).  The skin (cellulitis).  The bowel, gallbladder, or pancreas.  What increases the risk?  You are more likely to develop this condition if:  Your body's disease-fighting system (immune system) is weakened.  You are age 65 or older.  You are male.  You had surgery or you have been hospitalized.  You have these devices inserted into your body:  A small, thin tube (catheter).  IV line.  Breathing tube.  Drainage tube.  You are not getting enough nutrients from food (malnourished).  You have a chronic disease, such as cancer, lung disease, kidney disease, or diabetes.  What are the signs or symptoms?  Symptoms of this condition may include:  Fever.  Chills or feeling very cold.  Confusion or anxiety.  Fatigue.  Muscle aches.  Shortness of breath or rapid breathing  (hyperventilation).  Nausea and vomiting.  Urinating much less than usual.  Fast heart rate.  Changes in skin color. Your skin may look blotchy, pale, or blue.  Cool, clammy, or sweaty skin.  Skin rash.  Other symptoms depend on the source of your infection.  How is this diagnosed?  This condition is diagnosed based on your symptoms, medical history, and physical exam. Other tests may also be done to find out the cause of the infection and how severe the sepsis is. Tests may include:  Blood tests.  Urine tests.  Swabs from other areas of your body that may have an infection. These samples may be tested (cultured) to find out what type of bacteria is causing the infection.  Chest X-ray to check for pneumonia. Other imaging tests, such as a CT scan, may also be done.  Lumbar puncture. This removes a small amount of the fluid that surrounds your brain and spinal cord. The fluid is then examined for infection.  How is this treated?  This condition must be treated in a hospital. Based on the cause of your infection, you may be given an antibiotic, antiviral, or antifungal medicine.  You may also receive:  Fluids through an IV.  Oxygen and breathing assistance.  Medicines to increase your blood pressure.  Kidney dialysis. This process cleans your blood if your kidneys have failed.  Surgery to remove infected tissue.  Blood transfusion if needed.  Medicine to prevent blood clots.  Nutrients to correct imbalances in basic body function (metabolism). You may:  Receive important salts and minerals (electrolytes) through an IV.  Have your blood sugar level adjusted.  Follow these instructions at home:  Medicines    Take over-the-counter and prescription medicines only as told by your health care provider.  If you were prescribed an antibiotic, antiviral, or antifungal medicine, take it as told by your health care provider. Do not stop taking the medicine even if you start to feel better.  General instructions  If you have a  catheter or other indwelling device, ask to have it removed as soon as possible.  Keep all follow-up visits. This is important.  Contact a health care provider if:  You do not feel like you are getting better or regaining strength.  You are having trouble coping with your recovery.  You frequently feel tired.  You feel worse or do not seem to get better after surgery.  You think you may have an infection after surgery.  Get help right away if:  You have any symptoms of sepsis.  You have difficulty breathing.  You have a rapid or skipping heartbeat.  You become confused or disoriented.  You have a high fever.  Your skin becomes blotchy, pale, or blue.  You have an infection that is getting worse or not getting better.  These symptoms may represent a serious problem that is an emergency. Do not wait to see if the symptoms will go away. Get medical help right away. Call your local emergency services (911 in the [place] Do not drive yourself to the hospital.  Summary  Sepsis is a medical emergency that requires immediate treatment in a hospital.  This condition is caused by a severe reaction to infections from bacteria, viruses, or fungus.  Based on the cause of your infection, you may be given an antibiotic, antiviral, or antifungal medicine.  Treatment may also include IV fluids, breathing assistance, and kidney dialysis.  This information is not intended to replace advice given to you by your health care provider. Make sure you discuss any questions you have with your health care provider.  Document Revised: 11/01/2021 Document Reviewed: 11/01/2021  [employer] Patient Education © 2023 [employer]

## 2024-11-26 NOTE — PROGRESS NOTES
Patient admitted overnight with sepsis secondary to bilateral pneumonia; non-STEMI likely due to demand ischemia    I saw and examined the patient at bedside    On room air  Wbc 13  Plt 99  Hb 12s  Trop 419>380    Cta no PE, BL and R middle lobe infiltrates, greatest in the right lower lobe;  pulmonary edema;  Small layering right pleural effusion  Ct a/p multiple pancreatic cysts; Nodular hepatic contour compatible cirrhosis    I consulted the cardio, considered type II, I discontinued heparin drip.     Continue Rocephin azithromycin for pneumonia    ACP: patient admitted with  sepsis, bilateral pneumonia; non-STEMI. Age of 79. I discussed the diagnosis and treatment plan with her. We discussed the code status including CPR, intubation, the risks and benefits. She wants to stays full code. ACP 16MIN

## 2024-11-26 NOTE — PROGRESS NOTES
Monitor Summary:     Rhythm: SR  Rate: 79-96  Ectopy: (R)PVC  Measurements: .17/.08/.38                   12 Hour Chart Check

## 2024-11-26 NOTE — PROGRESS NOTES
DC instructions reviewed w/ pt and son , verbalized understanding. PIV dc'd, armband removed. Meds to bed delivered.

## 2024-11-26 NOTE — THERAPY
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 female with a diagnosis of sepsis 2/2 bilateral pneumonia and non-STEMI likely due to demand ischemia after presenting on 11/24 with chills. Additional factors influencing patient status / progress: PMHx includes cirrhosis of the liver, adrenal insufficiency on prednisone, DVT on Eliquis, asthma.      During OT eval, pt presented with decreased strength, activity tolerance and mobility but was able to mobilize as detailed below for ADL related activity with the FWW and overall SBA. Per pt's son, pt is at high risk for R hip dislocation and has had multiple over the past year 2/2 failed R hip replacement. Pt reports she has AE at home but doesn't typically need to use it. Pt presents at or near her functional baseline and has good support at home from family and a caregiver. No further acute OT needs.     Plan    Occupational Therapy Initial Treatment Plan   Duration: (P) Discharge Needs Only    DC Equipment Recommendations: (P) Unable to determine at this time  Discharge Recommendations: (P) Anticipate that the patient will have no further occupational therapy needs after discharge from the hospital     Subjective    Pt agreeable to OT eval.      Objective     11/26/24 0910   Initial Contact Note    Initial Contact Note Order Received and Verified, Evaluation Only - Patient Does Not Require Further Acute Occupational Therapy at this Time.  However, May Benefit from Post Acute Therapy for Higher Level Functional Deficits.   Prior Living Situation   Housing / Facility 1 [facility]   Steps Into Home   (ramp)   Steps In Home 0   Equipment Owned 4-Wheel  Walker;Tub / Shower Seat;Hand Held Shower;Sock Aid;Reacher;Long Handled Shoehorn   Lives with - Patient's Self Care Capacity Adult Children   Comments Pt lives with her son who works and reports when he is at work, they have a caregiver come so pt is only home alone for short periods.   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    Sensation Upper Body   Upper Extremity Sensation  Not Tested   Upper Body Muscle Tone   Upper Body Muscle Tone  WDL   Neurological Concerns   Neurological Concerns No   Coordination Upper Body   Coordination WDL   Balance Assessment   Sitting Balance (Static) Good   Sitting Balance (Dynamic) Good   Standing Balance (Static) Fair +   Standing Balance (Dynamic) Fair   Weight Shift Sitting Good   Weight Shift Standing Good   Comments FWW   Bed Mobility    Supine to Sit Supervised   Scooting Supervised   Comments HOB elevated   ADL Assessment   Grooming   (declined, reports she already performed today)   Lower Body  Dressing   (can reach distal LE but high risk for R hip dislocation is limiting factor; reports she has AE at home; wearing her own underwear on arrival)   Toileting   (declined need, reports she was just up prior to)   Comments Reports no new difficulty   How much help from another person does the patient currently need...   Putting on and taking off regular lower body clothing? 3   Bathing (including washing, rinsing, and drying)? 3   Toileting, which includes using a toilet, bedpan, or urinal? 3   Putting on and taking off regular upper body clothing? 3   Taking care of personal grooming such as brushing teeth? 3   Eating meals? 4   6 Clicks Daily Activity Score 19   Functional Mobility   Sit to Stand Standby Assist   Bed, Chair, Wheelchair Transfer Standby Assist   Toilet Transfers Standby Assist   Transfer Method Stand Step   Mobility EOB>bathroom>chair with FWW and SBA   Activity Tolerance   Sitting in Chair post   Sitting Edge of Bed 5 min   Standing 2 min   Education Group   Role of Occupational Therapist Patient Response Patient;Family;Acceptance;Explanation;Verbal Demonstration   Occupational Therapy Initial Treatment Plan    Duration Discharge Needs Only   Problem List   Problem List Decreased Homemaking Skills;Decreased Functional Mobility;Decreased Activity Tolerance;Decreased Upper Extremity Strength Right;Decreased Upper Extremity Strength Left   Anticipated Discharge Equipment and Recommendations   DC Equipment Recommendations Unable to determine at this time   Discharge Recommendations Anticipate that the patient will have no further occupational therapy needs after discharge from the hospital   Interdisciplinary Plan of Care Collaboration   IDT Collaboration with  Nursing;Family / Caregiver;Physician;Physical Therapist   Patient Position at End of Therapy Seated;Chair Alarm On;Call Light within Reach;Tray Table within Reach;Phone within Reach;Family / Friend in Room   Collaboration Comments RN, PT  and physician updated, pt's son present and supportive   Session Information   Date / Session Number  11/26 d/c needs

## 2024-11-26 NOTE — PROGRESS NOTES
4 Eyes Skin Assessment Completed by ALEX Joyce and ALEX Wong.    Head Scab  Ears Redness and Blanching  Nose WDL  Mouth WDL  Neck Bruising  Breast/Chest Bruising, Scab, and Scar  Shoulder Blades Redness and Blanching  Spine Redness and Blanching  (R) Arm/Elbow/Hand Bruising, Scab, and Discoloration  (L) Arm/Elbow/Hand Bruising, Scab, and Discoloration  Abdomen WDL  Groin WDL  Scrotum/Coccyx/Buttocks Redness and Blanching  (R) Leg Redness, Scab, and Bruising  (L) Leg Redness, Scab, and Bruising  (R) Heel/Foot/Toe Redness, Blanching, and Boggy  (L) Heel/Foot/Toe Redness, Blanching, Swelling, and Scab          Devices In Places Tele Box, Blood Pressure Cuff, and Pulse Ox      Interventions In Place TAP System and Pillows    Possible Skin Injury No    Pictures Uploaded Into Epic N/A  Wound Consult Placed N/A  RN Wound Prevention Protocol Ordered No

## 2024-11-26 NOTE — PROGRESS NOTES
Bedside shift report received from day shift RN, assumed care of pt. Pt is A&O4, RA, on tele. Pt has her call light within reach, bed alarm on, bed in lowest position. Pt has heparin infusing, double verified in the MAR. PT verbalized wanting her PM sleep medication, MD notified awaiting orders.

## 2024-11-26 NOTE — PROGRESS NOTES
Cardiology Follow Up Progress Note    Date of Service  11/26/2024    Attending Physician  Olga Morrow M.D.    Chief Complaint   Elevated troponin level in setting of pneumonia.     HPI  Kerrie Burkett is a 79 y.o. female admitted 11/24/2024 with above.    Interim Events  No significant changes noted from cardiac standpoint within the past 24 hours.     Review of Systems  Review of Systems   Unable to perform ROS: Other       Vital signs in last 24 hours  Temp:  [36.1 °C (97 °F)-36.7 °C (98.1 °F)] 36.2 °C (97.2 °F)  Pulse:  [] 101  Resp:  [15-18] 15  BP: (107-142)/(57-86) 142/86  SpO2:  [92 %-95 %] 92 %    Physical Exam  Physical Exam  Constitutional:       Appearance: Normal appearance. She is well-developed and normal weight.   HENT:      Head: Normocephalic and atraumatic.      Mouth/Throat:      Mouth: Mucous membranes are moist.   Eyes:      Extraocular Movements: Extraocular movements intact.      Conjunctiva/sclera: Conjunctivae normal.   Cardiovascular:      Rate and Rhythm: Normal rate and regular rhythm.      Pulses: Normal pulses.      Heart sounds: Normal heart sounds.   Pulmonary:      Effort: Pulmonary effort is normal.      Breath sounds: Normal breath sounds.   Abdominal:      General: Bowel sounds are normal.      Palpations: Abdomen is soft.   Musculoskeletal:         General: Normal range of motion.      Cervical back: Normal range of motion and neck supple.   Skin:     General: Skin is warm and dry.   Neurological:      General: No focal deficit present.      Mental Status: She is alert and oriented to person, place, and time. Mental status is at baseline.   Psychiatric:         Mood and Affect: Mood normal.         Behavior: Behavior normal.         Thought Content: Thought content normal.         Judgment: Judgment normal.         Lab Review  Lab Results   Component Value Date/Time    WBC 5.1 11/26/2024 02:31 AM    RBC 3.71 (L) 11/26/2024 02:31 AM    HEMOGLOBIN 12.1 11/26/2024  "02:31 AM    HEMATOCRIT 38.0 11/26/2024 02:31 AM    .4 (H) 11/26/2024 02:31 AM    MCH 32.6 11/26/2024 02:31 AM    MCHC 31.8 (L) 11/26/2024 02:31 AM    MPV 10.9 11/26/2024 02:31 AM      Lab Results   Component Value Date/Time    SODIUM 140 11/26/2024 02:31 AM    POTASSIUM 4.0 11/26/2024 02:31 AM    CHLORIDE 109 11/26/2024 02:31 AM    CO2 22 11/26/2024 02:31 AM    GLUCOSE 70 11/26/2024 02:31 AM    BUN 11 11/26/2024 02:31 AM    CREATININE 0.53 11/26/2024 02:31 AM      Lab Results   Component Value Date/Time    ASTSGOT 68 (H) 11/26/2024 02:31 AM    ALTSGPT 37 11/26/2024 02:31 AM     Lab Results   Component Value Date/Time    TROPONINT 222 (H) 11/25/2024 06:43 PM       No results for input(s): \"NTPROBNP\" in the last 72 hours.  (Above labs reviewed.)       Current Facility-Administered Medications:     lactulose 20 GM/30ML solution 15 mL, 15 mL, Oral, BID, Austin Bernard M.D., 15 mL at 11/26/24 0505    predniSONE (Deltasone) tablet 10 mg, 10 mg, Oral, DAILY, Austin Bernard M.D., 10 mg at 11/26/24 0505    spironolactone (Aldactone) tablet 25 mg, 25 mg, Oral, Q DAY, Austin Bernard M.D., 25 mg at 11/26/24 0505    cefTRIAXone (Rocephin) syringe 1,000 mg, 1,000 mg, Intravenous, Q24HRS, Austin Bernard M.D., 1,000 mg at 11/26/24 0628    levothyroxine (Synthroid) tablet 88 mcg, 88 mcg, Oral, AM ES, Austin Bernard M.D., 88 mcg at 11/26/24 0505    acetaminophen (Tylenol) tablet 650 mg, 650 mg, Oral, Q6HRS PRN, Austin Bernard M.D., 650 mg at 11/25/24 2053    labetalol (Normodyne/Trandate) injection 10 mg, 10 mg, Intravenous, Q4HRS PRN, Austin Bernard M.D.    ondansetron (Zofran) syringe/vial injection 4 mg, 4 mg, Intravenous, Q4HRS PRN, Austin Bernard M.D.    ondansetron (Zofran ODT) dispertab 4 mg, 4 mg, Oral, Q4HRS PRN, Austin Bernard M.D.    azithromycin (Zithromax) tablet 500 mg, 500 mg, Oral, DAILY, Olga Morrow M.D., 500 mg at 11/26/24 0505    temazepam (Restoril) capsule 15 mg, 15 mg, " Oral, QHS PRN, Cyndie ADELINA Farris, A.P.R.N., 15 mg at 11/25/24 2053  (Medications reviewed.)    Cardiac Imaging and Procedures Review  CARDIAC STUDIES/PROCEDURES:     CTA OF CHEST (11/25/24)  1.  No pulmonary embolus appreciated.  2.  Small layering right pleural effusion  3.  Bilateral lower lobe and right middle lobe infiltrates, greatest in the right lower lobe  4.  Interlobular septal thickening and groundglass pulmonary opacities, appearance favoring component of pulmonary edema.    ECHOCARDIOGRAM CONCLUSIONS (11/26/24)  Normal left ventricular systolic function.  Mild mitral regurgitation.  Thickened/calcified aortic valve without stenosis  Mitral annular and subvalvular calcification, no mitral stenosis  (study result reviewed)     EKG performed on (11/25/24) EKG shows sinus rhythm.     Assessment/Plan  Elevated troponin level in setting of pneumonia: She is clinically doing well from cardiac standpoint. We plan to perform a myocardial perfusion imaging study as outpatient.  History of deep venous thrombosis on chronic anticoagulation, cirrhosis, adrenal insufficiency treated with steroid therapy.      Thank you for allowing me to participate in the care of this patient.  Cardiology will sign off on this patient    Please contact me with any questions.    Guido Garcia M.D.   Cardiologist, [facility]  [phone]

## 2024-11-26 NOTE — DISCHARGE SUMMARY
Discharge Summary    CHIEF COMPLAINT ON ADMISSION  No chief complaint on file.      Reason for Admission  NSTEMI, Bilateral PNA, Sepsis     Admission Date  11/24/2024    CODE STATUS  Prior    HPI & HOSPITAL COURSE  Kerrie Burkett is a 79 y.o. female who presented 11/24/2024 with chills.  Patient has a history of cirrhosis of the liver, adrenal insufficiency on prednisone, DVT on Eliquis, asthma.  She presents to ED for chills and found sepsis due to bilateral pneumonia. Concerned of NSTEMI given elevated troponin and patient was started on heparin drip. Patient denies chest pain, EKG no acute pathology. Considered demanding ischemia. Cariology consulted, no further intervention indicated.     Cta no PE, BL and R middle lobe infiltrates, greatest in the right lower lobe;  pulmonary edema;  Small layering right pleural effusion  Ct a/p multiple pancreatic cysts; Nodular hepatic contour compatible cirrhosis    Patient has been treated with iv Unasyn and azithromycin. . She feels much better, on room air. Wbc 13>5. Will continue Augmentin at discharge.      Patient lives with the son.  Patient is going to [place] for winter and stays with her daughter.  They do not want home health at this point.  Patient is also planning for outpatient right hip replacement.     Therefore, she is discharged in good and stable condition to home with close outpatient follow-up.    The patient met 2-midnight criteria for an inpatient stay at the time of discharge.    Discharge Date  11/26/2024    FOLLOW UP ITEMS POST DISCHARGE  - Follow up with primary care physician in 1 week.     - Please take the medications as instructed    - Go to the local Emergency Department if you have any worsening condition.       DISCHARGE DIAGNOSES  Principal Problem:    Sepsis (HCC) (POA: Unknown)  Active Problems:    NSTEMI (non-ST elevated myocardial infarction) (HCC) (POA: Yes)    Adrenal insufficiency (HCC) (POA: Unknown)    Hypothyroidism (POA:  Unknown)    History of DVT (deep vein thrombosis) (POA: Unknown)    Cirrhosis (HCC) (POA: Unknown)    Lactic acidosis (POA: Unknown)    ACP (advance care planning) (POA: Unknown)  Resolved Problems:    Hypomagnesemia (POA: Unknown)      FOLLOW UP  Future Appointments   Date Time Provider Department Center   12/10/2024  9:40 AM Guido Garcia M.D. [facility] None     PCP    Schedule an appointment as soon as possible for a visit in 2 week(s)        MEDICATIONS ON DISCHARGE     Medication List        START taking these medications        Instructions   amoxicillin-clavulanate 875-125 MG Tabs  Commonly known as: Augmentin   Take 1 Tablet by mouth 2 times a day for 5 days.  Dose: 1 Tablet     azithromycin 500 MG tablet  Start taking on: November 27, 2024  Commonly known as: Zithromax   Take 1 Tablet by mouth every day for 1 day.  Dose: 500 mg     guaiFENesin  MG Tb12  Commonly known as: Mucinex   Take 1 Tablet by mouth every 12 hours for 7 days.  Dose: 600 mg            CONTINUE taking these medications        Instructions   acetaminophen 500 MG Tabs  Commonly known as: Tylenol   Take 500-1,000 mg by mouth every 6 hours as needed for Mild Pain.  Dose: 500-1,000 mg     Eliquis 5 MG Tabs  Generic drug: apixaban   Take 5 mg by mouth 2 times a day.  Dose: 5 mg     fluticasone-salmeterol 250-50 MCG/ACT Aepb  Commonly known as: Advair   Inhale 1 Puff 2 times a day.  Dose: 1 Puff     lactulose 10 GM/15ML Soln   Take 15 g by mouth 1 time a day as needed (constipation).  Dose: 15 g     levothyroxine 88 MCG Tabs  Commonly known as: Synthroid   Take 88 mcg by mouth every day.  Dose: 88 mcg     predniSONE 10 MG Tabs  Commonly known as: Deltasone   Take 10 mg by mouth every day.  Dose: 10 mg     spironolactone 25 MG Tabs  Commonly known as: Aldactone   Take 25 mg by mouth every day.  Dose: 25 mg     temazepam 15 MG Caps  Commonly known as: Restoril   Take 15 mg by mouth at bedtime as needed for Sleep.  Dose: 15 mg               Allergies  No Known Allergies    DIET  No orders of the defined types were placed in this encounter.      ACTIVITY  As tolerated.  Weight bearing as tolerated    CONSULTATIONS  Cardiology    PROCEDURES      LABORATORY  Lab Results   Component Value Date    SODIUM 140 11/26/2024    POTASSIUM 4.0 11/26/2024    CHLORIDE 109 11/26/2024    CO2 22 11/26/2024    GLUCOSE 70 11/26/2024    BUN 11 11/26/2024    CREATININE 0.53 11/26/2024        Lab Results   Component Value Date    WBC 5.1 11/26/2024    HEMOGLOBIN 12.1 11/26/2024    HEMATOCRIT 38.0 11/26/2024    PLATELETCT 91 (L) 11/26/2024        Total time of the discharge process exceeds 35 minutes.

## 2024-11-26 NOTE — PROGRESS NOTES
Monitor Summary:  Rhythm: NSR-ST  Rate:   Ectopy: PVC, PAC  0.17/ 0.06/ 0.36  No Media available

## 2024-11-26 NOTE — CARE PLAN
The patient is Stable - Low risk of patient condition declining or worsening    Shift Goals  Clinical Goals: Heparin gtt, monitor labs, antibiotics  Patient Goals: sleep, discharge tomorrow  Family Goals: darell    Progress made toward(s) clinical / shift goals:    Problem: Knowledge Deficit - Standard  Goal: Patient and family/care givers will demonstrate understanding of plan of care, disease process/condition, diagnostic tests and medications  Outcome: Progressing     Problem: Skin Integrity  Goal: Skin integrity is maintained or improved  Outcome: Progressing     Problem: Fall Risk  Goal: Patient will remain free from falls  Outcome: Progressing  Note: Pt free from falls. Call light within reach, bed alarm on, bed in lowest position     Problem: Self Care  Goal: Patient will have the ability to perform ADLs independently or with assistance (bathe, groom, dress, toilet and feed)  Outcome: Progressing     Problem: Pain - Standard  Goal: Alleviation of pain or a reduction in pain to the patient’s comfort goal  Outcome: Progressing       Patient is not progressing towards the following goals:

## 2024-11-30 LAB
BACTERIA BLD CULT: NORMAL
BACTERIA BLD CULT: NORMAL
SIGNIFICANT IND 70042: NORMAL
SIGNIFICANT IND 70042: NORMAL
SITE SITE: NORMAL
SITE SITE: NORMAL
SOURCE SOURCE: NORMAL
SOURCE SOURCE: NORMAL

## 2024-12-10 ENCOUNTER — APPOINTMENT (OUTPATIENT)
Dept: CARDIOLOGY | Facility: MEDICAL CENTER | Age: 79
End: 2024-12-10
Attending: INTERNAL MEDICINE
Payer: MEDICARE